# Patient Record
(demographics unavailable — no encounter records)

---

## 2018-08-10 NOTE — HP
CIWA Score





- CIWA Score


Nausea/Vomitin-Mild Nausea/No Vomiting


Muscle Tremors: 2


Anxiety: 3


Agitation: 2


Paroxysmal Sweats: 1-Minimal Palms Moist


Orientation: 0-Oriented


Tacttile Disturbances: 1-Very Mild Itch/Numbness


Auditory Disturbances: 0-None


Visual Disturbances: 0-None


Headache: 2-Mild


CIWA-Ar Total Score: 12





Admission ROS [facility]





- HPI


Chief Complaint: 





ETOH WITHDRAWAL SYMPTOMS.


Allergies/Adverse Reactions: 


 Allergies











Allergy/AdvReac Type Severity Reaction Status Date / Time


 


No Known Allergies Allergy   Verified 08/10/18 10:34











History of Present Illness: 





PATIENT PRESENTS WITH ETOH WITHDRAWAL SYMPTOMS, COCAINE AND MARIJUANA 

DEPENDENCE. PATIENT STATES HE STARTED DRINKING AT AGE 18. BINGE DRINKS FOR DAYS 

AT A TIME. LAST DRINK TODAY. PATIENT CAN DRINK UP TO 1-2 CASES OF BEER DAILY. 

DENIES SEIZURES. FIRST ATTEMT OF DETOX TODAY. PT ALSO SNIFFS 50 DOLLARS WORTH 

OF COCAINE DAILY. LAST TIME HE USED WAS LAST NIGHT AND SMOKES 3 BAGS OF 

MARIJUANA A DAY. LAST USE YESTERDAY. STATES HE IS DEPRESSED BECAUSE HIS FATHER 

HAS LEUKEMIA. DENIES PSYCH TREATMENT. DENIES SI/HI AND SUICIDE ATTEMPTS. 


Exam Limitations: Intoxication





- Ebola screening


Have you traveled outside of the country in the last 21 days: No (N)


Have you had contact with anyone from an Ebola affected area: No


Have you been sick,other than usual withdrawal symptoms: No


Do you have a fever: No





- Review of Systems


Constitutional: Changes in sleep, Unexplained wgt Loss


EENT: reports: No Symptoms Reported


Respiratory: reports: No Symptoms reported


Cardiac: reports: No Symptoms Reported


GI: reports: Nausea, Poor Appetite, Poor Fluid Intake, Abdominal cramping


: reports: No Symptoms Reported


Integumentary: reports: Sweating


Neuro: reports: Headache, Numbness, Tingling, Tremors


Endocrine: reports: Unexplained Weight Loss


Hematology: reports: No Symptoms Reported


Psychiatric: reports: Orientated x3, Anxious, Depressed





Patient History





- Patient Medical History


Hx Anemia: No


Hx Asthma: No


Hx Chronic Obstructive Pulmonary Disease (COPD): No


Hx Cancer: No


Hx Cardiac Disorders: No


Hx Congestive Heart Failure: No


Hx Hypertension: No


Hx Hypercholesterolemia: No


Hx Pacemaker: No


HX Cerebrovascular Accident: No


Hx Seizures: No


Hx Dementia: No


Hx Diabetes: No


Hx Gastrointestinal Disorders: No


Hx Liver Disease: No


Hx Genitourinary Disorders: No


Hx Sexually Transmitted Disorders: No


Hx Renal Disease (ESRD): No


Hx Thyroid Disease: No


Hx Human Immunodeficiency Virus (HIV): No


Hx Hepatitis C: No


Hx Depression: No


Hx Suicide Attempt: No


Hx Bipolar Disorder: No


Hx Schizophrenia: No





- Patient Surgical History


Past Surgical History: No


Hx Neurologic Surgery: No


Hx Cataract Extraction: No


Hx Cardiac Surgery: No


Hx Lung Surgery: No


Hx Breast Surgery: No


Hx Breast Biopsy: No


Hx Abdominal Surgery: No


Hx Appendectomy: No


Hx Cholecystectomy: No


Hx Genitourinary Surgery: No


Hx Orthopedic Surgery: No


Anesthesia Reaction: No





- PPD History


Previous Implant?: Yes


Documented Results: Negative w/o proof


Implanted On Prior [facility] Admission?: No


PPD to be Administered?: Yes





- Smoking Cessation


Smoking history: Current every day smoker


Have you smoked in the past 12 months: Yes


Aproximately how many cigarettes per day: 40


Hx Chewing Tobacco Use: No


Initiated information on smoking cessation: Yes


'Breaking Loose' booklet given: 08/10/18





- Substance & Tx. History


Hx Alcohol Use: Yes


Hx Substance Use: No


Substance Use Type: Alcohol, Cocaine, Marijuana


Hx Substance Use Treatment: No





- Substances Abused


  ** Cocaine


Route: Inhalation


Amount used: 1 gm


Age of first use: 19


Date of Last Use: 08/10/18





  ** Alcohol-beer


Route: Oral


Frequency: 3-6 times per week


Amount used: 1 case


Age of first use: 18


Date of Last Use: 08/10/18





  ** Marijuana


Route: Inhalation


Frequency: Daily


Amount used: $50


Age of first use: 18


Date of Last Use: 08/10/18





Family Disease History





- Family Disease History


Family Disease History: [place]: Father (LEUKEMIA)





Admission Physical Exam [facility]





- Vital Signs


Vital Signs: 


 Vital Signs - 24 hr











  08/10/18





  10:01


 


Temperature 97.5 F L


 


Pulse Rate 75


 


Respiratory 18





Rate 


 


Blood Pressure 124/91














- Physical


General Appearance: Yes: Alcohol on Breath, Thin, Tremorous, Sweating, Anxious


HEENTM: Yes: EOMI, Hearing grossly Normal, Normocephalic, Normal Voice, BREANNA, 

Pharynx Normal


Respiratory: Yes: Chest Non-Tender, Lungs Clear, Normal Breath Sounds, No 

Respiratory Distress, No Accessory Muscle Use


Neck: Yes: No masses,lesions,Nodules, Supple


Breast: Yes: Breast Exam Deferred


Cardiology: Yes: Regular Rhythm, Regular Rate, S1, S2


Abdominal: Yes: Normal Bowel Sounds, Non Tender, Flat, Soft


Genitourinary: Yes: Within Normal Limits


Back: Yes: Normal Inspection


Musculoskeletal: Yes: full range of Motion, Gait Steady


Extremities: Yes: Normal Inspection, Normal Range of Motion, Non-Tender, Tremors


Neurological: Yes: CNs II-XII NML intact, Fully Oriented, Alert, Motor Strength 

5/5, Depressed Affect


Integumentary: Yes: Normal Color, Warm, Moist


Lymphatic: Yes: Within Normal Limits





- Diagnostic


(1) Alcohol dependence with uncomplicated withdrawal


Current Visit: Yes   Status: Acute   





(2) Marijuana dependence


Current Visit: Yes   Status: Chronic   





(3) Nicotine dependence


Current Visit: Yes   Status: Chronic   


Qualifiers: 


   Substance use status: uncomplicated 





(4) Cocaine dependence


Current Visit: Yes   Status: Chronic   


Qualifiers: 


   Substance use status: uncomplicated   Qualified Code(s): F14.20 - Cocaine 

dependence, uncomplicated   





(5) Weight loss


Current Visit: Yes   Status: Acute   





Cleared for Admission [facility]





- Detox or Rehab


[facility] Level of Care: Medically Managed


Detox Regimen/Protocol: Librium





[facility] Breath Alcohol Content


Breath Alcohol Content: 0.115





Urine Drug Screen





- Results


Drug Screen Negative: No


Urine Drug Screen Results: THC-Marijuana, CHANTALE-Cocaine

## 2018-08-11 NOTE — PN
S CIWA





- CIWA Score


Nausea/Vomitin


Muscle Tremors: 3


Anxiety: 3


Agitation: 3


Paroxysmal Sweats: 3


Orientation: 0-Oriented


Tacttile Disturbances: 0-None


Auditory Disturbances: 0-None


Visual Disturbances: 0-None


Headache: 0-None Present


CIWA-Ar Total Score: 14





[facility] Progress Note (SOAP)


Subjective: 





tremors


shakes


sweats


Objective: 





18 22:54


A & O x 3


 Laboratory Last Values











WBC  8.9 K/mm3 (4.0-10.0)   08/10/18  11:45    


 


RBC  4.77 M/mm3 (4.00-5.60)   08/10/18  11:45    


 


Hgb  14.9 GM/dL (11.7-16.9)   08/10/18  11:45    


 


Hct  43.2 % (35.4-49)   08/10/18  11:45    


 


MCV  90.4 fl (80-96)   08/10/18  11:45    


 


MCH  31.2 pg (25.7-33.7)   08/10/18  11:45    


 


MCHC  34.5 g/dl (32.0-35.9)   08/10/18  11:45    


 


RDW  12.8 % (11.9-15.9)   08/10/18  11:45    


 


Plt Count  323 K/MM3 (134-434)   08/10/18  11:45    


 


MPV  7.4 fl (7.5-11.1)  L  08/10/18  11:45    


 


Sodium  140 mmol/L (136-145)   08/10/18  11:45    


 


Potassium  3.9 mmol/L (3.5-5.1)   08/10/18  11:45    


 


Chloride  104 mmol/L ()   08/10/18  11:45    


 


Carbon Dioxide  28 mmol/L (21-32)   08/10/18  11:45    


 


Anion Gap  8  (8-16)   08/10/18  11:45    


 


BUN  7 mg/dL (7-18)   08/10/18  11:45    


 


Creatinine  0.8 mg/dL (0.7-1.3)   08/10/18  11:45    


 


Creat Clearance w eGFR  > 60  (>60)   08/10/18  11:45    


 


Random Glucose  77 mg/dL ()   08/10/18  11:45    


 


Calcium  9.1 mg/dL (8.5-10.1)   08/10/18  11:45    


 


Total Bilirubin  0.7 mg/dL (0.2-1.0)   08/10/18  11:45    


 


AST  30 U/L (15-37)   08/10/18  11:45    


 


ALT  25 U/L (12-78)   08/10/18  11:45    


 


Alkaline Phosphatase  74 U/L ()   08/10/18  11:45    


 


Total Protein  8.6 g/dl (6.4-8.2)  H  08/10/18  11:45    


 


Albumin  5.0 g/dl (3.4-5.0)   08/10/18  11:45    


 


RPR Titer  Nonreactive  (NONREACTIVE)   08/10/18  11:00    


 


HIV 1&2 Antibody Screen  Negative   08/10/18  11:00    


 


HIV P24 Antigen  Negative   08/10/18  11:00    








labs noted


UA pending


Assessment: 





18 22:55


withdrawal sx


Plan: 





continue detox

## 2018-08-11 NOTE — EKG
Test Reason : 

Blood Pressure : ***/*** mmHG

Vent. Rate : 080 BPM     Atrial Rate : 080 BPM

   P-R Int : 154 ms          QRS Dur : 084 ms

    QT Int : 366 ms       P-R-T Axes : 068 053 059 degrees

   QTc Int : 422 ms

 

NORMAL SINUS RHYTHM

POSSIBLE LEFT ATRIAL ENLARGEMENT

BORDERLINE ECG

NO PREVIOUS ECGS AVAILABLE

Confirmed by ARTEMIO MEYERS, RADHAMES ([id number]) on 8/11/2018 9:08:41 AM

 

Referred By:             Confirmed By:RADHAMES ULLOA MD

## 2018-08-11 NOTE — CONSULT
[facility] Psychiatric Consult





- Data


Date of interview: 08/11/18


Admission source: [facility]


Identifying data: Patient is a 22 year old [place] male, unemployed (denies 

receiving financial assistance), and currently resides with a roomate. This is 

patient's first admission to detox at [facility]. Patient admitted to  for 

alcohol, marijuana, and cocaine dependence.


Substance Abuse History: Smoking Cessation.  Smoking history: Current every day 

smoker.  Have you smoked in the past 12 months: Yes.  Aproximately how many 

cigarettes per day: 40.  Hx Chewing Tobacco Use: No.  Initiated information on 

smoking cessation: Yes.  'Breaking Loose' booklet given: 08/10/18.  - Substance 

& Tx. History.  Hx Alcohol Use: Yes.  Hx Substance Use: No.  Substance Use Type

: Alcohol, Cocaine, Marijuana.  Hx Substance Use Treatment: No.  - Substances 

Abused.  ** Cocaine.  Route: Inhalation.  Amount used: 1 gm.  Age of first use: 

19.  Date of Last Use: 08/10/18.  ** Alcohol-beer.  Route: Oral.  Frequency: 3-

6 times per week.  Amount used: 1 case.  Age of first use: 18.  Date of Last Use

: 08/10/18.  ** Marijuana.  Route: Inhalation.  Frequency: Daily.  Amount used: 

$50.  Age of first use: 18.  Date of Last Use: 08/10/18


Medical History: Denies.


Psychiatric History: Patient denies h/o psychiatric hospitalization, outpatient 

care, and suicide attempt.


Physical/Sexual Abuse/Trauma History: Denies.





Mental Status Exam





- Mental Status Exam


Alert and Oriented to: Time, Place, Person


Cognitive Function: Good


Patient Appearance: Well Groomed


Mood: Euthymic


Affect: Mood Congruent


Patient Behavior: Cooperative


Speech Pattern: Appropriate (Mongolian speaking only. )


Voice Loudness: Moderately Soft/Quiet


Thought Process: Goal Oriented


Thought Disorder: Not Present


Hallucinations: Denies


Suicidal Ideation: Denies


Homicidal Ideation: Denies


Insight/Judgement: Poor


Sleep: Fair


Appetite: Fair


Muscle strength/Tone: Normal


Gait/Station: Normal





Psychiatric Findings





- Problem List (Axis 1, 2,3)


(1) Alcohol dependence with uncomplicated withdrawal


Current Visit: Yes   Status: Acute   





(2) Cocaine dependence


Current Visit: Yes   Status: Chronic   


Qualifiers: 


   Substance use status: uncomplicated   Qualified Code(s): F14.20 - Cocaine 

dependence, uncomplicated   





(3) Marijuana dependence


Current Visit: Yes   Status: Chronic   





(4) Nicotine dependence


Current Visit: Yes   Status: Chronic   


Qualifiers: 


   Substance use status: uncomplicated 





(5) Substance induced mood disorder


Current Visit: Yes   Status: Suspected   





- Initial Treatment Plan


Initial Treatment Plan: Psychoeducation provided. Detoxification in progress. 

Observation.

## 2018-08-12 NOTE — PN
S CIWA





- CIWA Score


Nausea/Vomitin


Muscle Tremors: 2


Anxiety: 2


Agitation: 2


Paroxysmal Sweats: 2


Orientation: 0-Oriented


Tacttile Disturbances: 1-Very Mild Itch/Numbness


Auditory Disturbances: 1-Very Mild


Visual Disturbances: 1-Very Mild Sensitivity


Headache: 2-Mild


CIWA-Ar Total Score: 15





[facility] Progress Note (SOAP)


Subjective: 





Tremors, sweats and sleep interruption


Objective: 





18 11:40


 Vital Signs











  18





  06:00 10:00 11:30


 


Temperature 97.0 F L 96.6 F L 96.7 F L


 


Pulse Rate 48 L 52 L 51 L


 


Respiratory 18 16 16





Rate   


 


Blood Pressure 108/50 102/47 107/53








 Laboratory Last Values











WBC  8.9 K/mm3 (4.0-10.0)   08/10/18  11:45    


 


RBC  4.77 M/mm3 (4.00-5.60)   08/10/18  11:45    


 


Hgb  14.9 GM/dL (11.7-16.9)   08/10/18  11:45    


 


Hct  43.2 % (35.4-49)   08/10/18  11:45    


 


MCV  90.4 fl (80-96)   08/10/18  11:45    


 


MCH  31.2 pg (25.7-33.7)   08/10/18  11:45    


 


MCHC  34.5 g/dl (32.0-35.9)   08/10/18  11:45    


 


RDW  12.8 % (11.9-15.9)   08/10/18  11:45    


 


Plt Count  323 K/MM3 (134-434)   08/10/18  11:45    


 


MPV  7.4 fl (7.5-11.1)  L  08/10/18  11:45    


 


Sodium  140 mmol/L (136-145)   08/10/18  11:45    


 


Potassium  3.9 mmol/L (3.5-5.1)   08/10/18  11:45    


 


Chloride  104 mmol/L ()   08/10/18  11:45    


 


Carbon Dioxide  28 mmol/L (21-32)   08/10/18  11:45    


 


Anion Gap  8  (8-16)   08/10/18  11:45    


 


BUN  7 mg/dL (7-18)   08/10/18  11:45    


 


Creatinine  0.8 mg/dL (0.7-1.3)   08/10/18  11:45    


 


Creat Clearance w eGFR  > 60  (>60)   08/10/18  11:45    


 


Random Glucose  77 mg/dL ()   08/10/18  11:45    


 


Calcium  9.1 mg/dL (8.5-10.1)   08/10/18  11:45    


 


Total Bilirubin  0.7 mg/dL (0.2-1.0)   08/10/18  11:45    


 


AST  30 U/L (15-37)   08/10/18  11:45    


 


ALT  25 U/L (12-78)   08/10/18  11:45    


 


Alkaline Phosphatase  74 U/L ()   08/10/18  11:45    


 


Total Protein  8.6 g/dl (6.4-8.2)  H  08/10/18  11:45    


 


Albumin  5.0 g/dl (3.4-5.0)   08/10/18  11:45    


 


RPR Titer  Nonreactive  (NONREACTIVE)   08/10/18  11:00    


 


HIV 1&2 Antibody Screen  Negative   08/10/18  11:00    


 


HIV P24 Antigen  Negative   08/10/18  11:00    








Labs noted


Assessment: 





18 11:41


Withdrawal sx


Plan: 





Continue detox

## 2018-08-13 NOTE — DS
[facility] Detox Discharge Summary


Admission Date: 


08/10/18





Discharge Date: 08/13/18





- History


Present History: Alcohol Dependence, Cannabis Dependence, Cocaine Dependence


Additional Comments: 





follow up with after care program as arrangement


Pertinent Past History: 





nicotine dependence


weight loss





- Physical Exam Results


Vital Signs: 


 Vital Signs











Temperature  96.6 F L  08/13/18 10:43


 


Pulse Rate  58 L  08/13/18 10:43


 


Respiratory Rate  16   08/13/18 10:43


 


Blood Pressure  105/53   08/13/18 10:43


 


O2 Sat by Pulse Oximetry (%)      











Pertinent Admission Physical Exam Findings: 





withdrawal signs and symptom


 Vital Signs











Temperature  96.6 F L  08/13/18 10:43


 


Pulse Rate  58 L  08/13/18 10:43


 


Respiratory Rate  16   08/13/18 10:43


 


Blood Pressure  105/53   08/13/18 10:43


 


O2 Sat by Pulse Oximetry (%)      








 Laboratory Last Values











WBC  8.9 K/mm3 (4.0-10.0)   08/10/18  11:45    


 


RBC  4.77 M/mm3 (4.00-5.60)   08/10/18  11:45    


 


Hgb  14.9 GM/dL (11.7-16.9)   08/10/18  11:45    


 


Hct  43.2 % (35.4-49)   08/10/18  11:45    


 


MCV  90.4 fl (80-96)   08/10/18  11:45    


 


MCH  31.2 pg (25.7-33.7)   08/10/18  11:45    


 


MCHC  34.5 g/dl (32.0-35.9)   08/10/18  11:45    


 


RDW  12.8 % (11.9-15.9)   08/10/18  11:45    


 


Plt Count  323 K/MM3 (134-434)   08/10/18  11:45    


 


MPV  7.4 fl (7.5-11.1)  L  08/10/18  11:45    


 


Sodium  140 mmol/L (136-145)   08/10/18  11:45    


 


Potassium  3.9 mmol/L (3.5-5.1)   08/10/18  11:45    


 


Chloride  104 mmol/L ()   08/10/18  11:45    


 


Carbon Dioxide  28 mmol/L (21-32)   08/10/18  11:45    


 


Anion Gap  8  (8-16)   08/10/18  11:45    


 


BUN  7 mg/dL (7-18)   08/10/18  11:45    


 


Creatinine  0.8 mg/dL (0.7-1.3)   08/10/18  11:45    


 


Creat Clearance w eGFR  > 60  (>60)   08/10/18  11:45    


 


Random Glucose  77 mg/dL ()   08/10/18  11:45    


 


Calcium  9.1 mg/dL (8.5-10.1)   08/10/18  11:45    


 


Total Bilirubin  0.7 mg/dL (0.2-1.0)   08/10/18  11:45    


 


AST  30 U/L (15-37)   08/10/18  11:45    


 


ALT  25 U/L (12-78)   08/10/18  11:45    


 


Alkaline Phosphatase  74 U/L ()   08/10/18  11:45    


 


Total Protein  8.6 g/dl (6.4-8.2)  H  08/10/18  11:45    


 


Albumin  5.0 g/dl (3.4-5.0)   08/10/18  11:45    


 


RPR Titer  Nonreactive  (NONREACTIVE)   08/10/18  11:00    


 


HIV 1&2 Antibody Screen  Negative   08/10/18  11:00    


 


HIV P24 Antigen  Negative   08/10/18  11:00    














- Treatment


Hospital Course: Detox Protocol Followed, Detoxed Safely, Responded well, 

Discharged Condition Good


Patient has Accepted a Rehab Referral to: declined





- Medication


Discharge Medications: 


Ambulatory Orders





NK [No Known Home Medication]  08/10/18 











- Diagnosis


(1) Alcohol dependence with uncomplicated withdrawal


Current Visit: Yes   Status: Acute   





(2) Nicotine dependence


Current Visit: Yes   Status: Acute   


Qualifiers: 


   Substance use status: uncomplicated 





(3) Cocaine dependence


Current Visit: Yes   Status: Chronic   


Qualifiers: 


   Substance use status: uncomplicated   Qualified Code(s): F14.20 - Cocaine 

dependence, uncomplicated   





(4) Marijuana dependence


Current Visit: Yes   Status: Chronic   





(5) Weight loss


Current Visit: Yes   Status: Chronic   





(6) Substance induced mood disorder


Current Visit: Yes   Status: Suspected   





- AMA


Did Patient Leave Against Medical Advice: No

## 2018-08-13 NOTE — PN
[facility] Progress Note (SOAP)


Subjective: 





alert,no complaint


Objective: 





08/13/18 10:52


 Vital Signs











Temperature  96.6 F L  08/13/18 10:43


 


Pulse Rate  58 L  08/13/18 10:43


 


Respiratory Rate  16   08/13/18 10:43


 


Blood Pressure  105/53   08/13/18 10:43


 


O2 Sat by Pulse Oximetry (%)      











Assessment: 





08/13/18 10:52


stable for discharge today


Plan: 





discharge today,follow up with after care program as arrangement

## 2019-04-25 NOTE — HP
CIWA Score


Nausea/Vomiting: 3


Muscle Tremors: None


Anxiety: 3


Agitation: 0-Normal Activity


Paroxysmal Sweats: No Perspiration


Orientation: 1-Uncertain about Date


Tacttile Disturbances: 1-Very Mild Itch/Numbness


Auditory Disturbances: 0-None


Visual Disturbances: 1-Very Mild Sensitivity


Headache: 3-Moderate


CIWA-Ar Total Score: 12





- Admission Criteria


OASAS Guidelines: Admission for Medically Managed Detox: 


Requires at least one of the followin. CIWA greater than 12


2. Seizures within the past 24 hours


3. Delirium tremens within the past 24 hours


4. Hallucinations within the past 24 hours


5. Acute intervention needed for co  occurring medical disorder


6. Acute intervention needed for co  occurring psychiatric disorder


7. Severe withdrawal that cannot be handled at a lower level of care (continued


    vomiting, continued diarrhea, abnormal vital signs) requiring intravenous


    medication and/or fluids


8. Pregnancy





Patient presents the following: CIWA greater than 12


Admission Criteria Met: Admission criteria met





Admission ROS [facility]





- HPI


Chief Complaint: 





alcohol withdrawal symptoms 


Allergies/Adverse Reactions: 


 Allergies











Allergy/AdvReac Type Severity Reaction Status Date / Time


 


No Known Allergies Allergy   Verified 19 15:49











History of Present Illness: 





Patient is a 24 yo male with hx of alcohol, cocaine and marijuana dependence is 

here seeking detox. Last detox [facility] 2018. Reports referred from program 

new Create. Psych: depression and anxiety, reports received psychiatric 

treated in [facility].  Denies hx of seizures, reports new onset of 

ETOH blackouts. Reports recent fall four days ago while intoxicated.  Denies SI/

HI. 








Exam Limitations: No Limitations





- Ebola screening


Have you traveled outside of the country in the last 21 days: No


Have you had contact with anyone from an Ebola affected area: No


Do you have a fever: No





- Review of Systems


Constitutional: Chills, Loss of Appetite, Changes in sleep, Unintentional Wgt. 

Loss


EENT: reports: Other (light sensitivity)


Respiratory: reports: No Symptoms reported


Cardiac: reports: Palpitations


GI: reports: Poor Appetite, Poor Fluid Intake, Indigestion


: reports: No Symptoms Reported


Musculoskeletal: reports: Back Pain


Integumentary: reports: No Symptoms Reported


Neuro: reports: Headache


Endocrine: reports: No Symptoms Reported


Hematology: reports: No Symptoms Reported


Psychiatric: reports: Orientated x3, Anxious


Other Systems: Reviewed and Negative





Patient History





- Patient Medical History


Hx Anemia: No


Hx Asthma: No


Hx Chronic Obstructive Pulmonary Disease (COPD): No


Hx Cancer: No


Hx Cardiac Disorders: No


Hx Congestive Heart Failure: No


Hx Hypertension: No


Hx Hypercholesterolemia: No


Hx Pacemaker: No


HX Cerebrovascular Accident: No


Hx Seizures: No


Hx Dementia: No


Hx Diabetes: No


Hx Gastrointestinal Disorders: No


Hx Liver Disease: No


Hx Genitourinary Disorders: No


Hx Sexually Transmitted Disorders: No


Hx Renal Disease (ESRD): No


Hx Thyroid Disease: No


Hx Human Immunodeficiency Virus (HIV): No


Hx Hepatitis C: No


Hx Depression: Yes


Hx Suicide Attempt: No


Hx Bipolar Disorder: No


Hx Schizophrenia: No





- Patient Surgical History


Past Surgical History: No


Hx Neurologic Surgery: No


Hx Cataract Extraction: No


Hx Cardiac Surgery: No


Hx Lung Surgery: No


Hx Breast Surgery: No


Hx Breast Biopsy: No


Hx Abdominal Surgery: No


Hx Appendectomy: No


Hx Cholecystectomy: No


Hx Genitourinary Surgery: No


Hx  Section: No


Hx Orthopedic Surgery: No


Anesthesia Reaction: No





- PPD History


Previous Implant?: No


Documented Results: Negative w/proof


Date: 18


PPD to be Administered?: No





- Smoking Cessation


Smoking history: Current every day smoker


Have you smoked in the past 12 months: Yes


Aproximately how many cigarettes per day: 40


Hx Chewing Tobacco Use: No


Initiated information on smoking cessation: Yes


'Breaking Loose' booklet given: 19





- Substance & Tx. History


Hx Alcohol Use: Yes


Hx Substance Use: Yes


Substance Use Type: Alcohol


Hx Substance Use Treatment: Yes ([facility] detox 2018)





- Substances abused


  ** Alcohol


Substance route: Oral


Frequency: Daily


Amount used: 3 Liter x Liquor + 3-5 x six pks beers ( 12 oz)


Age of first use: 19


Date of last use: 19





  ** Marijuana/Hashish


Substance route: Smoking


Frequency: Daily


Amount used: 7 blunts


Age of first use: 19


Date of last use: 19





  ** Cocaine


Substance route: Inhalation


Frequency: Daily


Amount used: 4 grams


Age of first use: 19


Date of last use: 19





Family Disease History





- Family Disease History


Family Disease History: [place]: Father (LEUKEMIA, alcoholism )





Admission Physical Exam [facility]





- Vital Signs


Vital Signs: 


 Vital Signs - 24 hr











  19





  15:49 16:03


 


Temperature 98.1 F 98.1 F


 


Pulse Rate 68 68


 


Respiratory 18 18





Rate  


 


Blood Pressure 124/64 124/64














- Physical


General Appearance: Yes: Appropriately Dressed, Disheveled, Thin, Anxious


HEENTM: Yes: Hearing grossly Normal, Normal ENT Inspection, Normocephalic, 

Normal Voice, BREANNA, Pharynx Normal, Tm's normal, Other (cheilitis)


Respiratory: Yes: Chest Non-Tender, Lungs Clear, Normal Breath Sounds, No 

Respiratory Distress, No Accessory Muscle Use


Neck: Yes: Within Normal Limits


Breast: Yes: Breast Exam Deferred


Cardiology: Yes: Regular Rhythm, Regular Rate


Abdominal: Yes: Normal Bowel Sounds, Non Tender, Flat, Soft


Genitourinary: Yes: Within Normal Limits


Back: Yes: Normal Inspection


Musculoskeletal: Yes: full range of Motion, Gait Steady, Pelvis Stable, Back 

pain


Extremities: Yes: Normal Capillary Refill, Normal Inspection, Normal Range of 

Motion, Non-Tender


Neurological: Yes: CNs II-XII NML intact, Fully Oriented, Alert, Motor Strength 

5/5, Depressed Affect


Integumentary: Yes: Normal Color, Warm, Diaphoresis, Other (adrasion on left 

lower extremity)


Lymphatic: Yes: Within Normal Limits





- Diagnostic


(1) Alcohol dependence with uncomplicated withdrawal


Current Visit: Yes   Status: Acute   





(2) Nicotine dependence


Current Visit: Yes   Status: Acute   


Qualifiers: 


   Nicotine product type: cigarettes   Substance use status: uncomplicated   

Qualified Code(s): F17.210 - Nicotine dependence, cigarettes, uncomplicated   





(3) Cocaine dependence


Current Visit: Yes   Status: Chronic   


Qualifiers: 


   Substance use status: uncomplicated   Qualified Code(s): F14.20 - Cocaine 

dependence, uncomplicated   





(4) Marijuana dependence


Current Visit: Yes   Status: Chronic   





(5) Weight loss


Current Visit: Yes   Status: Chronic   





Cleared for Admission [facility]





- Detox or Rehab


[facility] Level of Care: Medically Managed


Detox Regimen/Protocol: Librium





Breathalyzer





- Breathalyzer


Breathalyzer: 0





Urine Drug Screen





- Test Device


Lot number: [id number]


Expiration date: 20





- Control


Is test valid?: Yes





- Results


Drug screen NEGATIVE: No


Urine drug screen results: THC-Marijuana, CHANTALE-Cocaine





Inpatient Rehab Admission





- Rehab Decision to Admit


Inpatient rehab admission?: No

## 2019-04-26 NOTE — PN
[facility]HS CIWA





- CIWA Score


Nausea/Vomitin-Mild Nausea/No Vomiting


Muscle Tremors: 2


Anxiety: 1-Mildly Anxious


Agitation: 1-Slight > Activity


Paroxysmal Sweats: 1-Minimal Palms Moist


Orientation: 0-Oriented


Tacttile Disturbances: 0-None


Auditory Disturbances: 0-None


Visual Disturbances: 0-None


Headache: 1-Very Mild


CIWA-Ar Total Score: 7





BHS Progress Note (SOAP)


Subjective: 





pt feels he is still in withdrawal, day #2 of alcohol detox protocol





O:


 Vital Signs - 24 hr











  19





  15:49 16:03 17:57


 


Temperature 98.1 F 98.1 F 97.9 F


 


Pulse Rate 68 68 62


 


Respiratory 18 18 18





Rate   


 


Blood Pressure 124/64 124/64 120/63














  19





  21:41 03:30 06:00


 


Temperature 97.7 F  97.7 F


 


Pulse Rate 66  54 L


 


Respiratory 18 18 18





Rate   


 


Blood Pressure 138/60  104/54 L














  19





  09:05 09:22 13:01


 


Temperature 97.3 F L 97.3 F L 97.7 F


 


Pulse Rate 53 L 53 L 79


 


Respiratory 18 18 18





Rate   


 


Blood Pressure 107/58 L 107/58 L 115/61








 Laboratory Tests











  19





  06:45 06:45 06:45


 


WBC  5.3  


 


RBC  4.41  


 


Hgb  13.4  


 


Hct  40.2  


 


MCV  91.1  


 


MCH  30.4  


 


MCHC  33.3  


 


RDW  12.4  


 


Plt Count  379  


 


MPV  7.4 L  


 


Sodium   141 


 


Potassium   4.0 


 


Chloride   106 


 


Carbon Dioxide   30 


 


Anion Gap   5 L 


 


BUN   7 


 


Creatinine   0.8 


 


Creat Clearance w eGFR   119.80 


 


Random Glucose   99 


 


Calcium   9.0 


 


Total Bilirubin   0.2 


 


AST   21 


 


ALT   20 


 


Alkaline Phosphatase   67 


 


Total Protein   6.6 


 


Albumin   3.7 


 


RPR Titer    Nonreactive








a/p: continue alcohol detox protocol- d/w pt that he has prn meds for Sx that 

he can ask for as needed


labs and VS WNL

## 2019-04-27 NOTE — PN
S CIWA





- CIWA Score


Nausea/Vomitin-No Nausea/No Vomiting


Muscle Tremors: 2


Anxiety: 2


Agitation: 2


Paroxysmal Sweats: 2


Orientation: 0-Oriented


Tacttile Disturbances: 0-None


Auditory Disturbances: 0-None


Visual Disturbances: 0-None


Headache: 0-None Present


CIWA-Ar Total Score: 8





BHS Progress Note (SOAP)


Subjective: 





sweats


tired


sleepy


Objective: 





19 11:14


 Vital Signs











Temperature  98.2 F   19 09:34


 


Pulse Rate  51 L  19 09:34


 


Respiratory Rate  16   19 09:34


 


Blood Pressure  100/55 L  19 09:34


 


O2 Sat by Pulse Oximetry (%)      








aaox3


ambulating


no acute distress


Assessment: 





19 11:15


withdrawal sx


Plan: 





continue detox


increase fluids

## 2019-04-28 NOTE — DS
[facility] Detox Discharge Summary


Admission Date: 


04/25/19





Discharge Date: 04/28/19





- History


Present History: Alcohol Dependence


Additional Comments: 





23 years old male admitted on 04/25/19 for alcohol withdrawal stabilization


feeling better wants to begin alcohol rehab today alert no acute distress 

denies suicidal ideation


aftercare positive direction


Pertinent Past History: 





bring in medication list and lab report to aftercare appointment





- Physical Exam Results


Vital Signs: 


 Vital Signs











Temperature  96.7 F L  04/28/19 09:24


 


Pulse Rate  70   04/28/19 09:24


 


Respiratory Rate  18   04/28/19 09:24


 


Blood Pressure  128/67   04/28/19 09:24


 


O2 Sat by Pulse Oximetry (%)      











Pertinent Admission Physical Exam Findings: 





alcohol withdrawal sx


 Laboratory Last Values











WBC  5.3 K/mm3 (4.0-10.0)   04/26/19  06:45    


 


RBC  4.41 M/mm3 (4.00-5.60)   04/26/19  06:45    


 


Hgb  13.4 GM/dL (11.7-16.9)   04/26/19  06:45    


 


Hct  40.2 % (35.4-49)   04/26/19  06:45    


 


MCV  91.1 fl (80-96)   04/26/19  06:45    


 


MCH  30.4 pg (25.7-33.7)   04/26/19  06:45    


 


MCHC  33.3 g/dl (32.0-35.9)   04/26/19  06:45    


 


RDW  12.4 % (11.9-15.9)   04/26/19  06:45    


 


Plt Count  379 K/MM3 (134-434)   04/26/19  06:45    


 


MPV  7.4 fl (7.5-11.1)  L  04/26/19  06:45    


 


Sodium  141 mmol/L (136-145)   04/26/19  06:45    


 


Potassium  4.0 mmol/L (3.5-5.1)   04/26/19  06:45    


 


Chloride  106 mmol/L ()   04/26/19  06:45    


 


Carbon Dioxide  30 mmol/L (21-32)   04/26/19  06:45    


 


Anion Gap  5 MMOL/L (8-16)  L  04/26/19  06:45    


 


BUN  7 mg/dL (7-18)   04/26/19  06:45    


 


Creatinine  0.8 mg/dL (0.55-1.3)   04/26/19  06:45    


 


Creat Clearance w eGFR  119.80  (>60)   04/26/19  06:45    


 


Random Glucose  99 mg/dL ()   04/26/19  06:45    


 


Calcium  9.0 mg/dL (8.5-10.1)   04/26/19  06:45    


 


Total Bilirubin  0.2 mg/dL (0.2-1)   04/26/19  06:45    


 


AST  21 U/L (15-37)   04/26/19  06:45    


 


ALT  20 U/L (13-61)   04/26/19  06:45    


 


Alkaline Phosphatase  67 U/L ()   04/26/19  06:45    


 


Total Protein  6.6 g/dl (6.4-8.2)   04/26/19  06:45    


 


Albumin  3.7 g/dl (3.4-5.0)   04/26/19  06:45    


 


RPR Titer  Nonreactive  (NONREACTIVE)   04/26/19  06:45    








lab noted





- Treatment


Hospital Course: Detox Protocol Followed, Detoxed Safely, Responded well, 

Discharged Condition Good, Rehab Referral Accepted


Patient has Accepted a Rehab Referral to: positive direction





- Medication


Discharge Medications: 


Ambulatory Orders





NK [No Known Home Medication]  08/10/18 











- Diagnosis


(1) Alcohol dependence with uncomplicated withdrawal


Current Visit: Yes   Status: Acute   





(2) Nicotine dependence


Current Visit: Yes   Status: Acute   


Qualifiers: 


   Nicotine product type: cigarettes   Substance use status: in withdrawal   

Qualified Code(s): F17.213 - Nicotine dependence, cigarettes, with withdrawal   





(3) Weight loss


Current Visit: Yes   Status: Acute   





(4) Substance induced mood disorder


Current Visit: Yes   Status: Suspected   





- AMA


Did Patient Leave Against Medical Advice: No

## 2019-04-28 NOTE — PN
S CIWA





- CIWA Score


Nausea/Vomitin-No Nausea/No Vomiting


Muscle Tremors: 1-None Visible, but Felt


Anxiety: 1-Mildly Anxious


Agitation: 1-Slight > Activity


Paroxysmal Sweats: No Perspiration


Orientation: 0-Oriented


Tacttile Disturbances: 0-None


Auditory Disturbances: 0-None


Visual Disturbances: 0-None


Headache: 0-None Present


CIWA-Ar Total Score: 3





BHS Progress Note (SOAP)


Subjective: 





feeling better today enforce unit rule and regulation


Objective: 





19 11:39


 Vital Signs











Temperature  96.7 F L  19 09:24


 


Pulse Rate  70   19 09:24


 


Respiratory Rate  18   19 09:24


 


Blood Pressure  128/67   19 09:24


 


O2 Sat by Pulse Oximetry (%)      








 Laboratory Last Values











WBC  5.3 K/mm3 (4.0-10.0)   19  06:45    


 


RBC  4.41 M/mm3 (4.00-5.60)   19  06:45    


 


Hgb  13.4 GM/dL (11.7-16.9)   19  06:45    


 


Hct  40.2 % (35.4-49)   19  06:45    


 


MCV  91.1 fl (80-96)   19  06:45    


 


MCH  30.4 pg (25.7-33.7)   19  06:45    


 


MCHC  33.3 g/dl (32.0-35.9)   19  06:45    


 


RDW  12.4 % (11.9-15.9)   19  06:45    


 


Plt Count  379 K/MM3 (134-434)   19  06:45    


 


MPV  7.4 fl (7.5-11.1)  L  19  06:45    


 


Sodium  141 mmol/L (136-145)   19  06:45    


 


Potassium  4.0 mmol/L (3.5-5.1)   19  06:45    


 


Chloride  106 mmol/L ()   19  06:45    


 


Carbon Dioxide  30 mmol/L (21-32)   19  06:45    


 


Anion Gap  5 MMOL/L (8-16)  L  19  06:45    


 


BUN  7 mg/dL (7-18)   19  06:45    


 


Creatinine  0.8 mg/dL (0.55-1.3)   19  06:45    


 


Creat Clearance w eGFR  119.80  (>60)   19  06:45    


 


Random Glucose  99 mg/dL ()   19  06:45    


 


Calcium  9.0 mg/dL (8.5-10.1)   19  06:45    


 


Total Bilirubin  0.2 mg/dL (0.2-1)   19  06:45    


 


AST  21 U/L (15-37)   19  06:45    


 


ALT  20 U/L (13-61)   19  06:45    


 


Alkaline Phosphatase  67 U/L ()   19  06:45    


 


Total Protein  6.6 g/dl (6.4-8.2)   19  06:45    


 


Albumin  3.7 g/dl (3.4-5.0)   19  06:45    


 


RPR Titer  Nonreactive  (NONREACTIVE)   19  06:45    








lab noted


Assessment: 





19 11:40


mild withdrawal sx


19 11:42





Plan: 





continue detox

## 2019-06-11 NOTE — HP
CIWA Score


Nausea/Vomitin-No Nausea/No Vomiting


Muscle Tremors: 1-None Visible, but Felt


Anxiety: 4-Mod. Anxious/Guarded


Agitation: 4-Moderately Restless


Paroxysmal Sweats: 3


Orientation: 3-Disoriented Date>2 days


Tacttile Disturbances: 0-None


Auditory Disturbances: 0-None


Visual Disturbances: 2-Mild Sensitivity


Headache: 3-Moderate


CIWA-Ar Total Score: 20





- Admission Criteria


OASAS Guidelines: Admission for Medically Managed Detox: 


Requires at least one of the followin. CIWA greater than 12


2. Seizures within the past 24 hours


3. Delirium tremens within the past 24 hours


4. Hallucinations within the past 24 hours


5. Acute intervention needed for co  occurring medical disorder


6. Acute intervention needed for co  occurring psychiatric disorder


7. Severe withdrawal that cannot be handled at a lower level of care (continued


    vomiting, continued diarrhea, abnormal vital signs) requiring intravenous


    medication and/or fluids


8. Pregnancy





Patient presents the following: CIWA greater than 12


Admission Criteria Met: Admission criteria met





Admission ROS [facility]





- [facility]


Chief Complaint: 





C/O WITHDRAWAL SX'S . SEEKING DETOX TXMENT


Allergies/Adverse Reactions: 


 Allergies











Allergy/AdvReac Type Severity Reaction Status Date / Time


 


No Known Allergies Allergy   Verified 06/15/19 15:19











History of Present Illness: 





 Y.O. MALE WITH HX/O ALCOHOL AND COCAINE, CANNABIS DEPENDENCE HERE FOR DETOX. 

CLIENT IS SELF REFERRED. LAST HERE 2019. PRESENTS TODAY WITH C/O WORSENING 

WITHDRAWAL SX'S. CIWA 20. REPORTS DRINKING DAILY AND NEEDING EARLY MORNING 

DRINKS DUE TO WITHDRAWAL SX'S. DENIES HX/O SEIZURE, SI/HI/AVH. DOMICILED, 

UNEMPLOYED.


Exam Limitations: Language Barrier (Amharic SPEAKING)





- Ebola screening


Have you traveled outside of the country in the last 21 days: No (N)


Have you had contact with anyone from an Ebola affected area: No


Do you have a fever: No





- Review of Systems


Constitutional: Chills, Loss of Appetite, Night Sweats


EENT: reports: Eye Pain (SENSITIVITY TO LIGHT)


Respiratory: reports: Cough


Cardiac: reports: No Symptoms Reported


GI: reports: Poor Appetite, Poor Fluid Intake


: reports: No Symptoms Reported


Musculoskeletal: reports: Back Pain, Joint Pain


Integumentary: reports: No Symptoms Reported


Neuro: reports: Headache, Tremors (FELT)


Endocrine: reports: No Symptoms Reported


Hematology: reports: Anemia


Psychiatric: reports: Orientated x3, Anxious, Depressed


Other Systems: Reviewed and Negative





Patient History





- Patient Medical History


Hx Anemia: No


Hx Asthma: No


Hx Chronic Obstructive Pulmonary Disease (COPD): No


Hx Cancer: No


Hx Cardiac Disorders: No


Hx Congestive Heart Failure: No


Hx Hypertension: No


Hx Hypercholesterolemia: No


Hx Pacemaker: No


HX Cerebrovascular Accident: No


Hx Seizures: No


Hx Dementia: No


Hx Diabetes: No


Hx Gastrointestinal Disorders: No


Hx Liver Disease: No


Hx Genitourinary Disorders: No


Hx Sexually Transmitted Disorders: No


Hx Renal Disease (ESRD): No


Hx Thyroid Disease: No


Hx Human Immunodeficiency Virus (HIV): No


Hx Hepatitis C: No


Hx Depression: Yes


Hx Suicide Attempt: No


Hx Bipolar Disorder: No


Hx Schizophrenia: No





- Patient Surgical History


Past Surgical History: No


Hx Neurologic Surgery: No


Hx Cataract Extraction: No


Hx Cardiac Surgery: No


Hx Lung Surgery: No


Hx Breast Surgery: No


Hx Breast Biopsy: No


Hx Abdominal Surgery: No


Hx Appendectomy: No


Hx Cholecystectomy: No


Hx Genitourinary Surgery: No


Hx  Section: No


Hx Orthopedic Surgery: No


Anesthesia Reaction: No





- PPD History


Previous Implant?: Yes


Documented Results: Negative w/proof


Implanted On Prior [facility] Admission?: Yes


Date: 18


Results: MM


PPD to be Administered?: No





- Smoking Cessation


Smoking history: Current every day smoker


Have you smoked in the past 12 months: Yes


Aproximately how many cigarettes per day: 40


Cigars Per Day: 0


Hx Chewing Tobacco Use: No


Initiated information on smoking cessation: Yes


'Breaking Loose' booklet given: 19





- Substance & Tx. History


Hx Alcohol Use: Yes


Hx Substance Use: Yes


Substance Use Type: Alcohol, Cocaine, Marijuana


Hx Substance Use Treatment: Yes ([facility])





- Substances abused


  ** Alcohol


Substance route: Oral


Frequency: Daily


Amount used: 3 Liter x Liquor + 3-5 x six pks beers ( 12 oz)


Age of first use: 19


Date of last use: 09





  ** Marijuana/Hashish


Substance route: Smoking


Frequency: Daily


Amount used: 7 blunts


Age of first use: 19


Date of last use: 19





  ** Cocaine


Substance route: Inhalation


Frequency: Daily


Amount used: 4 grams


Age of first use: 19


Date of last use: 06/10/19





Family Disease History





- Family Disease History


Family Disease History: [place]: Father (LEUKEMIA, alcoholism )





Admission Physical Exam [facility]





- Vital Signs


Vital Signs: 


 Vital Signs - 24 hr











  19





  18:38


 


Temperature 97.5 F L


 


Pulse Rate 74


 


Respiratory 16





Rate 


 


Blood Pressure 115/67














- Physical


General Appearance: Yes: Moderate Distress, Tremorous (FELT), Anxious


HEENTM: Yes: EOMI, Normocephalic, Normal Voice, BREANNA, Pharynx Normal


Respiratory: Yes: Chest Non-Tender, Lungs Clear, Normal Breath Sounds, No 

Respiratory Distress, No Accessory Muscle Use


Neck: Yes: No masses,lesions,Nodules, Supple, Trachea in good position


Breast: Yes: Breast Exam Deferred


Cardiology: Yes: Regular Rhythm, Regular Rate, S1, S2


Abdominal: Yes: Normal Bowel Sounds, Non Tender, Flat, Soft


Genitourinary: Yes: Within Normal Limits


Back: Yes: Normal Inspection


Musculoskeletal: Yes: full range of Motion, Gait Steady


Extremities: Yes: Normal Capillary Refill, Normal Range of Motion, Non-Tender, 

Tremors (FELT)


Neurological: Yes: CNs II-XII NML intact, Fully Oriented, Alert, Motor Strength 

5/5, Depressed Affect


Integumentary: Yes: Dry, Warm


Lymphatic: Yes: Within Normal Limits





- Diagnostic


(1) At risk for dehydration due to poor fluid intake


Status: Acute   





(2) Alcohol dependence with uncomplicated withdrawal


Status: Chronic   





(3) Nicotine dependence


Status: Chronic   


Qualifiers: 


   Nicotine product type: cigarettes   Substance use status: uncomplicated   

Qualified Code(s): F17.210 - Nicotine dependence, cigarettes, uncomplicated   





(4) Cocaine dependence


Status: Chronic   


Qualifiers: 


   Substance use status: uncomplicated   Qualified Code(s): F14.20 - Cocaine 

dependence, uncomplicated   





(5) Marijuana dependence


Status: Chronic   





(6) Substance induced mood disorder


Status: Suspected   





Cleared for Admission [facility]





- Detox or Rehab


[facility] Level of Care: Medically Managed


Detox Regimen/Protocol: Librium


Claeared for Rehab Admission: No





Breathalyzer





- Breathalyzer


Breathalyzer: 0





Urine Drug Screen





- Test Device


Lot number: [id number]


Expiration date: 21





- Control


Is test valid?: Yes





- Results


Drug screen NEGATIVE: No


Urine drug screen results: THC-Marijuana, CHANTALE-Cocaine





Inpatient Rehab Admission





- Rehab Decision to Admit


Inpatient rehab admission?: No

## 2019-06-12 NOTE — PN
[facility] Progress Note


Note: 


Patient is in bed very sleepy, drowsy and cannot be interviewed at this time

## 2019-06-12 NOTE — PN
S CIWA





- CIWA Score


Nausea/Vomitin-No Nausea/No Vomiting


Muscle Tremors: 3


Anxiety: 3


Agitation: 4-Moderately Restless


Paroxysmal Sweats: 3


Orientation: 0-Oriented


Tacttile Disturbances: 0-None


Auditory Disturbances: 0-None


Visual Disturbances: 0-None


Headache: 0-None Present


CIWA-Ar Total Score: 13





BHS Progress Note (SOAP)


Subjective: 





sweats


shakes


interrupted sleep


body aches


irritable


nausea


Objective: 





19 10:46


 Vital Signs











Temperature  97.1 F L  19 09:57


 


Pulse Rate  61   19 09:57


 


Respiratory Rate  18   19 09:57


 


Blood Pressure  111/58 L  19 09:57


 


O2 Sat by Pulse Oximetry (%)      





aaox3


ambulating


no acute distress


Assessment: 





19 10:47


withdrawal sx








Plan: 





continue detox


increase fluids


zofran sl prn


pending labs

## 2019-06-14 NOTE — DS
[facility] Detox Discharge Summary


Admission Date: 


06/12/19





Discharge Date: 06/14/19





- History


Present History: Alcohol Dependence, Cannabis Dependence, Cocaine Dependence





- Physical Exam Results


Vital Signs: 


 Vital Signs











Temperature  97.5 F L  06/14/19 06:00


 


Pulse Rate  54 L  06/14/19 06:00


 


Respiratory Rate  16   06/14/19 06:00


 


Blood Pressure  97/52 L  06/14/19 06:00


 


O2 Sat by Pulse Oximetry (%)      














- Treatment


Hospital Course: Detox Protocol Followed, Detoxed Safely, Responded well, 

Discharged Condition Good, Rehab Referral Accepted





- Medication


Discharge Medications: 


Ambulatory Orders





NK [No Known Home Medication]  08/10/18 











- Diagnosis


(1) Alcohol dependence with uncomplicated withdrawal


Current Visit: Yes   Status: Chronic   





(2) At risk for dehydration due to poor fluid intake


Current Visit: Yes   Status: Acute   





(3) Cocaine dependence


Current Visit: Yes   Status: Chronic   


Qualifiers: 


   Substance use status: uncomplicated   Qualified Code(s): F14.20 - Cocaine 

dependence, uncomplicated   





(4) Marijuana dependence


Current Visit: Yes   Status: Chronic   





(5) Nicotine dependence


Current Visit: Yes   Status: Chronic   


Qualifiers: 


   Nicotine product type: cigarettes   Substance use status: uncomplicated   

Qualified Code(s): F17.210 - Nicotine dependence, cigarettes, uncomplicated   





(6) Substance induced mood disorder


Current Visit: Yes   Status: Suspected   





(7) Weight loss


Current Visit: No   Status: Acute   





- AMA


Did Patient Leave Against Medical Advice: No (pt declined going home)

## 2020-02-11 NOTE — HP
CIWA Score


Nausea/Vomiting: 3 (X 3)


Muscle Tremors: 3


Anxiety: 4-Mod. Anxious/Guarded


Agitation: 3


Paroxysmal Sweats: 2


Orientation: 0-Oriented


Tacttile Disturbances: 0-None


Auditory Disturbances: 0-None


Visual Disturbances: 0-None


Headache: 3-Moderate


CIWA-Ar Total Score: 18





- Admission Criteria


[facility] Guidelines: Admission for Medically Managed Detox: 


Requires at least one of the followin. CIWA greater than 12


2. Seizures within the past 24 hours


3. Delirium tremens within the past 24 hours


4. Hallucinations within the past 24 hours


5. Acute intervention needed for co  occurring medical disorder


6. Acute intervention needed for co  occurring psychiatric disorder


7. Severe withdrawal that cannot be handled at a lower level of care (continued


    vomiting, continued diarrhea, abnormal vital signs) requiring intravenous


    medication and/or fluids


8. Pregnancy








Admitting History and Physical





- Smoking History


Smoking history: Current every day smoker


Have you smoked in the past 12 months: Yes


Aproximately how many cigarettes per day: 40





- Alcohol/Substance Use


Hx Alcohol Use: Yes





Admission ROS [facility]





- HPI


Chief Complaint: 


Alcohol withdrawal symptoms


Allergies/Adverse Reactions: 


 Allergies











Allergy/AdvReac Type Severity Reaction Status Date / Time


 


No Known Allergies Allergy   Verified 20 21:05











History of Present Illness: 





24 years old male with 5 years history of alcohol dependence is seeking 

admission to detox. Patient last detox was for the period 2019-2019. He reports insignificant period of sobriety and states that he relapsed a 

couple of weeks after detox. He denies medical history and reports psych. 

history of depression. He reports + eye opener, blackouts and denies alcohol 

related seizures and suicidal ideation at this time. 


Exam Limitations: No Limitations





- Ebola screening


Have you traveled outside of the country in the last 21 days: No


Have you been sick,other than usual withdrawal symptoms: No


Do you have a fever: No





- Review of Systems


Constitutional: Malaise, Night Sweats, Changes in sleep


EENT: reports: Nose Congestion


Respiratory: reports: No Symptoms reported


Cardiac: reports: No Symptoms Reported


GI: reports: Poor Appetite, Poor Fluid Intake, Vomiting, Abdominal cramping


: reports: No Symptoms Reported


Musculoskeletal: reports: Back Pain


Integumentary: reports: Dryness, Flushing


Neuro: reports: Headache, Tremors


Endocrine: reports: No Symptoms Reported


Hematology: reports: No Symptoms Reported


Psychiatric: reports: No Sypmtoms Reported, Mood/Affect Appropiate, Orientated 

x3, Depressed


Other Systems: Reviewed and Negative





Patient History





- Patient Medical History


Hx Anemia: No


Hx Asthma: No


Hx Chronic Obstructive Pulmonary Disease (COPD): No


Hx Cancer: No


Hx Cardiac Disorders: No


Hx Congestive Heart Failure: No


Hx Hypertension: No


Hx Hypercholesterolemia: No


Hx Pacemaker: No


HX Cerebrovascular Accident: No


Hx Seizures: No


Hx Dementia: No


Hx Diabetes: No


Hx Gastrointestinal Disorders: No


Hx Liver Disease: No


Hx Genitourinary Disorders: No


Hx Sexually Transmitted Disorders: No


Hx Renal Disease (ESRD): No


Hx Thyroid Disease: No


Hx Human Immunodeficiency Virus (HIV): No


Hx Hepatitis C: No


Hx Depression: Yes


Hx Suicide Attempt: No


Hx Bipolar Disorder: No


Hx Schizophrenia: No





- Patient Surgical History


Past Surgical History: No


Hx Neurologic Surgery: No


Hx Cataract Extraction: No


Hx Cardiac Surgery: No


Hx Lung Surgery: No


Hx Abdominal Surgery: No


Hx Appendectomy: No


Hx Cholecystectomy: No


Hx Genitourinary Surgery: No


Hx Orthopedic Surgery: No


Anesthesia Reaction: No





- PPD History


Previous Implant?: Yes


Documented Results: Negative w/proof


Implanted On Prior R Admission?: Yes


Date: 18


Results: MM


PPD to be Administered?: Yes





- Reproductive History


Patient is a Female of Child Bearing Age (11 -55 yrs old): No (MALE)





- Smoking Cessation


Smoking history: Current every day smoker


Have you smoked in the past 12 months: Yes


Aproximately how many cigarettes per day: 40


Cigars Per Day: 0


Hx Chewing Tobacco Use: No


Initiated information on smoking cessation: Yes


'Breaking Loose' booklet given: 20





- Substance & Tx. History


Hx Alcohol Use: Yes


Hx Substance Use: Yes


Substance Use Type: Alcohol, Cocaine


Hx Substance Use Treatment: Yes ([facility])





- Substances abused


  ** Cocaine


Substance route: Inhalation


Frequency: Daily


Amount used: 5 grams


Age of first use: 19


Date of last use: 02/10/20





  ** Alcohol


Substance route: Oral


Frequency: Daily


Amount used: ' a lot'


Age of first use: 19


Date of last use: 02/10/20





  ** Other


Other (specify): Pecocet


Substance route: Oral


Frequency: Daily


Amount used: 10 mg


Age of first use: 19


Date of last use: 02/10/20





  ** Marijuana/Hashish


Substance route: Smoking


Frequency: Daily


Amount used: 40 bags


Age of first use: 19


Date of last use: 02/10/20





Admission Physical Exam [facility]





- Vital Signs


Vital Signs: 


 Vital Signs - 24 hr











  20





  19:12 19:34


 


Temperature 96.2 F L 96.2 F L


 


Pulse Rate 93 H 93 H


 


Respiratory 18 18





Rate  


 


Blood Pressure 107/82 107/82














- Physical


General Appearance: Yes: Moderate Distress, Tremorous, Anxious


HEENTM: Yes: Nasal Congestion


Respiratory: Yes: Lungs Clear, Normal Breath Sounds, No Respiratory Distress


Neck: Yes: Within Normal Limits


Breast: Yes: Breast Exam Deferred


Cardiology: Yes: Tachycardia


Abdominal: Yes: Normal Bowel Sounds, Soft


Genitourinary: Yes: Within Normal Limits


Back: Yes: Normal Inspection


Musculoskeletal: Yes: Back pain, Muscle Pain


Extremities: Yes: Tremors


Neurological: Yes: Within Normal Limits, Alert, Normal Mood/Affect


Integumentary: Yes: Warm


Lymphatic: Yes: Within Normal Limits





- Diagnostic


(1) Depression


Current Visit: Yes   Status: Acute   





(2) Alcohol dependence with uncomplicated withdrawal


Current Visit: Yes   Status: Acute   





(3) Cocaine dependence


Current Visit: Yes   Status: Chronic   


Qualifiers: 


   Substance use status: uncomplicated   Qualified Code(s): F14.20 - Cocaine 

dependence, uncomplicated   





(4) Marijuana dependence


Current Visit: Yes   Status: Chronic   





(5) Nicotine dependence


Current Visit: Yes   Status: Chronic   


Qualifiers: 


   Nicotine product type: cigarettes   Substance use status: in withdrawal   

Qualified Code(s): F17.213 - Nicotine dependence, cigarettes, with withdrawal   





Cleared for Admission [facility]





- Detox or Rehab


[facility] Level of Care: Medically Managed


Detox Regimen/Protocol: Librium


Claeared for Rehab Admission: Yes





Breathalyzer





- Breathalyzer


Breathalyzer: 0





Urine Drug Screen





- Test Device


Lot number: [id number]


Expiration date: 31





- Control


Is test valid?: Yes





- Results


Drug screen NEGATIVE: No


Urine drug screen results: CHANTALE-Cocaine





Inpatient Rehab Admission





- Rehab Decision to Admit


Inpatient rehab admission?: No

## 2020-02-12 NOTE — CONSULT
[facility] Psychiatric Consult





- Data


Date of interview: 02/12/20


Admission source: [facility]


Identifying data: THREE visits (medical students in attendance on the first 

attempt to engage with the patient) at bedside for psychiatric interview. Mr Weaver refused. Writer informed nursing staff of patient's uncooperativeness. 

Re-submit request for psychiatric consult if patient needs services.

## 2020-02-12 NOTE — EKG
Test Reason : 

Blood Pressure : ***/*** mmHG

Vent. Rate : 081 BPM     Atrial Rate : 081 BPM

   P-R Int : 164 ms          QRS Dur : 090 ms

    QT Int : 372 ms       P-R-T Axes : [phone] degrees

   QTc Int : 432 ms

 

NORMAL SINUS RHYTHM

POSSIBLE LEFT ATRIAL ENLARGEMENT

BORDERLINE ECG

WHEN COMPARED WITH ECG OF 10-AUG-2018 13:11,

NO SIGNIFICANT CHANGE WAS FOUND

Confirmed by Stanley Torres MD ([id number]) on 2/12/2020 10:44:36 AM

 

Referred By: Elijah Holbrook           Confirmed By:Stanley Torres MD

## 2020-02-12 NOTE — PN
S CIWA





- CIWA Score


Nausea/Vomitin


Muscle Tremors: 4-Moderate,w/Arms Extend


Anxiety: 1-Mildly Anxious


Agitation: 4-Moderately Restless


Paroxysmal Sweats: 2


Orientation: 0-Oriented


Tacttile Disturbances: 0-None


Auditory Disturbances: 0-None


Visual Disturbances: 0-None


Headache: 1-Very Mild


CIWA-Ar Total Score: 14





[facility] Progress Note (SOAP)


Subjective: 





24 years old male admitted on 20 for alcohol withdrawal sx management 

treating with librium detox regiment


irritable agitative at time limited conversation with staff 


Objective: 





20 11:41


 Vital Signs











Temperature  96.9 F L  20 09:12


 


Pulse Rate  82   20 09:12


 


Respiratory Rate  18   20 09:12


 


Blood Pressure  99/60   20 09:12


 


O2 Sat by Pulse Oximetry (%)      








 Laboratory Last Values











WBC  7.1 K/mm3 (4.0-10.0)   20  07:45    


 


RBC  4.65 M/mm3 (4.00-5.60)   20  07:45    


 


Hgb  14.1 GM/dL (11.7-16.9)   20  07:45    


 


Hct  42.2 % (35.4-49)   20  07:45    


 


MCV  90.6 fl (80-96)   20  07:45    


 


MCH  30.2 pg (25.7-33.7)   20  07:45    


 


MCHC  33.4 g/dl (32.0-35.9)   20  07:45    


 


RDW  13.1 % (11.9-15.9)   20  07:45    


 


Plt Count  386 K/MM3 (134-434)  D 20  07:45    


 


MPV  7.5 fl (7.5-11.1)   20  07:45    


 


Sodium  140 mmol/L (136-145)   20  07:45    


 


Potassium  4.6 mmol/L (3.5-5.1)   20  07:45    


 


Chloride  106 mmol/L ()   20  07:45    


 


Carbon Dioxide  29 mmol/L (21-32)   20  07:45    


 


Anion Gap  4 MMOL/L (8-16)  L  20  07:45    


 


BUN  17.9 mg/dL (7-18)   20  07:45    


 


Creatinine  0.9 mg/dL (0.55-1.3)   20  07:45    


 


Est GFR (CKD-EPI)AfAm  138.06   20  07:45    


 


Est GFR (CKD-EPI)NonAf  119.12   20  07:45    


 


Random Glucose  92 mg/dL ()   20  07:45    


 


Calcium  8.9 mg/dL (8.5-10.1)   20  07:45    


 


Total Bilirubin  0.2 mg/dL (0.2-1)   20  07:45    


 


AST  21 U/L (15-37)   20  07:45    


 


ALT  25 U/L (13-61)   20  07:45    


 


Alkaline Phosphatase  97 U/L ()   20  07:45    


 


Total Protein  7.4 g/dl (6.4-8.2)   20  07:45    


 


Albumin  3.9 g/dl (3.4-5.0)   20  07:45    








lab noted


Assessment: 





20 11:41


alcohol withdrawal 


Plan: 





librum regiment

## 2020-02-13 NOTE — DS
[facility] Detox Discharge Summary


Admission Date: 


02/11/20





Discharge Date: 02/13/20





- History


Present History: Alcohol Dependence


Pertinent Past History: 





Pt admitted 2 days ago. Spoke to pt with counselor. Pt states he wants to leave

- no reason given. Pt to leave AMA.





- Physical Exam Results


Vital Signs: 


 Vital Signs











Temperature  98.8 F   02/13/20 13:02


 


Pulse Rate  85   02/13/20 13:02


 


Respiratory Rate  18   02/13/20 13:02


 


Blood Pressure  111/62   02/13/20 13:02


 


O2 Sat by Pulse Oximetry (%)      














- Medication


Discharge Medications: 


Ambulatory Orders





NK [No Known Home Medication]  06/15/19 











- AMA


Did Patient Leave Against Medical Advice: Yes

## 2020-02-13 NOTE — PN
S CIWA





- CIWA Score


Nausea/Vomitin-Mild Nausea/No Vomiting


Muscle Tremors: 4-Moderate,w/Arms Extend


Anxiety: 2


Agitation: 0-Normal Activity


Paroxysmal Sweats: 2


Orientation: 0-Oriented


Tacttile Disturbances: 0-None


Auditory Disturbances: 0-None


Visual Disturbances: 1-Very Mild Sensitivity


Headache: 1-Very Mild


CIWA-Ar Total Score: 11





[facility] Progress Note (SOAP)


Subjective: 





24 years old male admitted on 20 for alcohol withdrawal sx management 

treating with librium detox regiment 


resting in bed limited conversation with staff "let me sleep" received 

melatonin last night and librium as scheduled


Objective: 





20 10:58


 Vital Signs











Temperature  98.2 F   20 09:01


 


Pulse Rate  74   20 09:01


 


Respiratory Rate  18   20 09:01


 


Blood Pressure  114/72   20 09:01


 


O2 Sat by Pulse Oximetry (%)      








 Laboratory Last Values











WBC  7.1 K/mm3 (4.0-10.0)   20  07:45    


 


RBC  4.65 M/mm3 (4.00-5.60)   20  07:45    


 


Hgb  14.1 GM/dL (11.7-16.9)   20  07:45    


 


Hct  42.2 % (35.4-49)   20  07:45    


 


MCV  90.6 fl (80-96)   20  07:45    


 


MCH  30.2 pg (25.7-33.7)   20  07:45    


 


MCHC  33.4 g/dl (32.0-35.9)   20  07:45    


 


RDW  13.1 % (11.9-15.9)   20  07:45    


 


Plt Count  386 K/MM3 (134-434)  D 20  07:45    


 


MPV  7.5 fl (7.5-11.1)   20  07:45    


 


Sodium  140 mmol/L (136-145)   20  07:45    


 


Potassium  4.6 mmol/L (3.5-5.1)   20  07:45    


 


Chloride  106 mmol/L ()   20  07:45    


 


Carbon Dioxide  29 mmol/L (21-32)   20  07:45    


 


Anion Gap  4 MMOL/L (8-16)  L  20  07:45    


 


BUN  17.9 mg/dL (7-18)   20  07:45    


 


Creatinine  0.9 mg/dL (0.55-1.3)   20  07:45    


 


Est GFR (CKD-EPI)AfAm  138.06   20  07:45    


 


Est GFR (CKD-EPI)NonAf  119.12   20  07:45    


 


Random Glucose  92 mg/dL ()   20  07:45    


 


Calcium  8.9 mg/dL (8.5-10.1)   20  07:45    


 


Total Bilirubin  0.2 mg/dL (0.2-1)   20  07:45    


 


AST  21 U/L (15-37)   20  07:45    


 


ALT  25 U/L (13-61)   20  07:45    


 


Alkaline Phosphatase  97 U/L ()   20  07:45    


 


Total Protein  7.4 g/dl (6.4-8.2)   20  07:45    


 


Albumin  3.9 g/dl (3.4-5.0)   20  07:45    


 


RPR Titer  Nonreactive  (NONREACTIVE)   20  07:45    








lab noted


Assessment: 





20 10:58


alcohol withdrawal 


Plan: 





librum regiment

## 2020-08-23 NOTE — B[facility].RME
Substance Use & Tx History





- Substance Use History


  ** Alcohol


Substance amount: 100 bottles the past 3 days, do not usually count


Frequency of use: Daily


Substance route: Oral


Date of Last Use: 20





- Last Treatment


Date of last treatment: 2020


Where was last treatment: Detox





Physical/Psych/Mental Status





- Behavior


General Behavior: Increased activity (restlessness, agitation)


Eye Contact: Normal


Other Behaviors: Mannerisms





- Cooperativeness


Cooperativeness: Cooperative





- Thinking


Thought Processes: Tight





- Physical Health Problems


Is patient presently having any pain?: No


Does patient presently have any injuries (include location): No


Does patient currently have a fever: No





CIWA


Nausea/Vomitin-Mild Nausea/No Vomiting


Muscle Tremors: 1-None Visible, but Felt


Anxiety: 3


Agitation: 3


Paroxysmal Sweats: 1-Minimal Palms Moist


Orientation: 1-Uncertain about Date


Tacttile Disturbances: 0-None


Auditory Disturbances: 0-None


Visual Disturbances: 0-None


Headache: 1-Very Mild


CIWA-Ar Total Score: 11

## 2020-08-23 NOTE — HP
CIWA Score


Nausea/Vomitin-Mild Nausea/No Vomiting (He drank this morning, he reports he

is "getting sick")


Muscle Tremors: 1-None Visible, but Felt


Anxiety: 3


Agitation: 3


Paroxysmal Sweats: 1-Minimal Palms Moist


Orientation: 1-Uncertain about Date


Tacttile Disturbances: 0-None


Auditory Disturbances: 0-None


Visual Disturbances: 0-None


Headache: 1-Very Mild


CIWA-Ar Total Score: 11





- Admission Criteria


[facility] Guidelines: Admission for Medically Managed Detox: 


Requires at least one of the followin. CIWA greater than 12


2. Seizures within the past 24 hours


3. Delirium tremens within the past 24 hours


4. Hallucinations within the past 24 hours


5. Acute intervention needed for co  occurring medical disorder


6. Acute intervention needed for co  occurring psychiatric disorder


7. Severe withdrawal that cannot be handled at a lower level of care (continued


    vomiting, continued diarrhea, abnormal vital signs) requiring intravenous


    medication and/or fluids


8. Pregnancy





Patient presents the following: CIWA greater than 12 (He consumed alcohol prior 

to coming here because he was begining to feel sick)


Admission Criteria Met: Admission criteria met





Admitting History and Physical





- Smoking History


Smoking history: Current every day smoker


Have you smoked in the past 12 months: Yes


Aproximately how many cigarettes per day: 40





- Alcohol/Substance Use


Hx Alcohol Use: Yes





Admission ROS [facility]





- HPI


Chief Complaint: 





I feel sick


Allergies/Adverse Reactions: 


                                    Allergies











Allergy/AdvReac Type Severity Reaction Status Date / Time


 


No Known Allergies Allergy   Verified 20 21:05











History of Present Illness: 





24 year old man presents for alcohol detox. Patient reports when he is drinking,

he doesn't count but he knows he has drank 100 of bottles the past 3 days. 


Exam Limitations: No Limitations





- Ebola screening


Have you traveled outside of the country in the last 21 days: No


Have you had contact with anyone from an Ebola affected area: No


Have you been sick,other than usual withdrawal symptoms: No


Do you have a fever: No





- Review of Systems


Constitutional: Loss of Appetite, Changes in sleep


EENT: reports: No Symptoms Reported


Respiratory: reports: Cough


Cardiac: reports: No Symptoms Reported


GI: reports: Poor Fluid Intake, Indigestion, Abdominal cramping


: reports: No Symptoms Reported


Musculoskeletal: reports: Muscle Pain, Muscle Weakness


Neuro: reports: Weakness


Endocrine: reports: No Symptoms Reported


Hematology: reports: No Symptoms Reported


Psychiatric: reports: Depressed


Other Systems: Reviewed and Negative





Patient History





- Patient Medical History


Hx Anemia: No


Hx Asthma: No


Hx Chronic Obstructive Pulmonary Disease (COPD): No


Hx Cancer: No


Hx Cardiac Disorders: No


Hx Congestive Heart Failure: No


Hx Hypertension: No


Hx Hypercholesterolemia: No


Hx Pacemaker: No


HX Cerebrovascular Accident: No


Hx Seizures: No


Hx Dementia: No


Hx Diabetes: No


Hx Gastrointestinal Disorders: No


Hx Liver Disease: No


Hx Genitourinary Disorders: No


Hx Sexually Transmitted Disorders: No


Hx Renal Disease (ESRD): No


Hx Thyroid Disease: No


Hx Human Immunodeficiency Virus (HIV): No


Hx Hepatitis C: No


Hx Depression: Yes


Hx Suicide Attempt: No


Hx Bipolar Disorder: No


Hx Schizophrenia: No





- Patient Surgical History


Past Surgical History: No





- PPD History


Documented Results: Negative w/proof


Implanted On Prior [facility] Admission?: Yes


Date: 18


Results: MM


PPD to be Administered?: Yes





- Smoking Cessation


Smoking history: Current every day smoker


Have you smoked in the past 12 months: Yes


Aproximately how many cigarettes per day: 40


Cigars Per Day: 0


Hx Chewing Tobacco Use: No


Initiated information on smoking cessation: Yes


'Breaking Loose' booklet given: 20





Admission Physical Exam [facility]





- Physical


General Appearance: Yes: Mild Distress, Alcohol on Breath


HEENTM: Yes: Normocephalic, Normal Voice


Respiratory: Yes: Chest Non-Tender, Lungs Clear, Normal Breath Sounds, No 

Respiratory Distress, No Accessory Muscle Use


Neck: Yes: No masses,lesions,Nodules, Supple


Breast: Yes: Breast Exam Deferred


Cardiology: Yes: Regular Rhythm, Tachycardia


Abdominal: Yes: Normal Bowel Sounds, Soft


Genitourinary: Yes: Within Normal Limits


Back: Yes: Normal Inspection


Musculoskeletal: Yes: Muscle weakness


Extremities: Yes: Non-Tender, Tremors (mild)


Neurological: Yes: Fully Oriented, Alert, Normal Response


Integumentary: Yes: Normal Color, Cold


Lymphatic: Yes: Within Normal Limits





- Diagnostic


(1) Alcohol dependence with uncomplicated withdrawal


Current Visit: Yes   Status: Acute   





(2) Nicotine dependence


Current Visit: Yes   Status: Chronic   


Qualifiers: 


   Nicotine product type: cigarettes   Substance use status: uncomplicated   

Qualified Code(s): F17.210 - Nicotine dependence, cigarettes, uncomplicated   





(3) Cocaine dependence


Current Visit: Yes   Status: Chronic   


Qualifiers: 


   Substance use status: uncomplicated   Qualified Code(s): F14.20 - Cocaine 

dependence, uncomplicated   





(4) Marijuana dependence


Current Visit: Yes   Status: Chronic   





Cleared for Admission S





- Detox or Rehab


[facility] Level of Care: Medically Managed


Detox Regimen/Protocol: Librium


Claeared for Rehab Admission: No





Breathalyzer





- Breathalyzer


Breathalyzer: 0.080





Urine Drug Screen





- Test Device


Lot number: [id number]


Expiration date: 22





- Control


Is test valid?: Yes





- Results


Drug screen NEGATIVE: No


Urine drug screen results: THC-Marijuana, CHANTALE-Cocaine





Inpatient Rehab Admission





- Rehab Decision to Admit


Inpatient rehab admission?: No

## 2020-08-24 NOTE — PN
S CIWA





- CIWA Score


Nausea/Vomitin-No Nausea/No Vomiting


Muscle Tremors: None


Anxiety: 3


Agitation: 2


Paroxysmal Sweats: 3


Orientation: 0-Oriented


Tacttile Disturbances: 0-None


Auditory Disturbances: 0-None


Visual Disturbances: 0-None


Headache: 1-Very Mild


CIWA-Ar Total Score: 9





[facility] Progress Note (SOAP)


Subjective: 





c/o sweats, anxiety, irritability, and headache.


Assessment: 





20 11:00


AOX3 and in no acute respiratory.


Full ROM, ambulating in the unit.


Withdrawal symptoms.


Plan: 





continue detox.
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
## 2020-08-25 NOTE — PN
S CIWA





- CIWA Score


Nausea/Vomitin-No Nausea/No Vomiting


Muscle Tremors: None


Anxiety: 2


Agitation: 1-Slight > Activity


Paroxysmal Sweats: 3


Orientation: 0-Oriented


Tacttile Disturbances: 0-None


Auditory Disturbances: 0-None


Visual Disturbances: 0-None


Headache: 0-None Present


CIWA-Ar Total Score: 6





[facility] Progress Note (SOAP)


Subjective: 





c/o sweats, headache, and anxiety.


Objective: 





20 11:49


                                   Vital Signs











  20





  07:29 08:43


 


Temperature 98.4 F 98.1 F


 


Pulse Rate 51 L 54 L


 


Respiratory 18 18





Rate  


 


Blood Pressure 105/59 L 107/56 L


 


O2 Sat by Pulse 97 97





Oximetry (%)  








                             Laboratory Last Values











WBC  6.2 K/mm3 (4.0-10.0)   20  07:45    


 


RBC  4.40 M/mm3 (4.00-5.60)   20  07:45    


 


Hgb  13.3 GM/dL (11.7-16.9)   20  07:45    


 


Hct  39.5 % (35.4-49)   20  07:45    


 


MCV  89.7 fl (80-96)   20  07:45    


 


MCH  30.2 pg (25.7-33.7)   20  07:45    


 


MCHC  33.6 g/dl (32.0-35.9)   20  07:45    


 


RDW  13.0 % (11.9-15.9)   20  07:45    


 


Plt Count  330 K/MM3 (134-434)   20  07:45    


 


MPV  7.3 fl (7.5-11.1)  L  20  07:45    


 


Sodium  139 mmol/L (136-145)   20  07:45    


 


Potassium  3.8 mmol/L (3.5-5.1)   20  07:45    


 


Chloride  105 mmol/L ()   20  07:45    


 


Carbon Dioxide  28 mmol/L (21-32)   20  07:45    


 


Anion Gap  5 MMOL/L (8-16)  L  20  07:45    


 


BUN  7.3 mg/dL (7-18)   20  07:45    


 


Creatinine  0.9 mg/dL (0.55-1.3)   20  07:45    


 


Est GFR (CKD-EPI)AfAm  138.06   20  07:45    


 


Est GFR (CKD-EPI)NonAf  119.12   20  07:45    


 


Random Glucose  115 mg/dL ()  H  20  07:45    


 


Calcium  9.1 mg/dL (8.5-10.1)   20  07:45    


 


Total Bilirubin  0.8 mg/dL (0.2-1)   20  07:45    


 


AST  25 U/L (15-37)   20  07:45    


 


ALT  23 U/L (13-61)   20  07:45    


 


Alkaline Phosphatase  64 U/L ()   20  07:45    


 


Total Protein  6.8 g/dl (6.4-8.2)   20  07:45    


 


Albumin  3.7 g/dl (3.4-5.0)   20  07:45    


 


Syphilis Serology  Non-reactive  (NONREACTIVE)   20  07:45    








Labs noted.


Assessment: 





20 11:49


AOX3, in no acute respiratory distress.


Full ROM,  ambulating in the unit.


Withdrawal symptoms.


For d/c tomorrow.


Plan: 





continue detox.


D/C in AM.

## 2020-08-25 NOTE — DS
S Detox Discharge Summary


Admission Date: 


08/23/20








- History


Additional Comments: 





writer  was  informed  by  nursing  that  pt  left , walked  off the  unit by  

himself . 








- Physical Exam Results


Vital Signs: 


                                   Vital Signs











Temperature  98.2 F   08/25/20 12:34


 


Pulse Rate  65   08/25/20 12:34


 


Respiratory Rate  19   08/25/20 12:34


 


Blood Pressure  106/56 L  08/25/20 12:34


 


O2 Sat by Pulse Oximetry (%)  99   08/25/20 12:34














- Medication


Discharge Medications: 


Ambulatory Orders





NK [No Known Home Medication]  06/15/19 











- AMA


Did Patient Leave Against Medical Advice: Yes
